# Patient Record
Sex: MALE | Race: WHITE | NOT HISPANIC OR LATINO | URBAN - METROPOLITAN AREA
[De-identification: names, ages, dates, MRNs, and addresses within clinical notes are randomized per-mention and may not be internally consistent; named-entity substitution may affect disease eponyms.]

---

## 2018-05-16 ENCOUNTER — HISTORICAL (OUTPATIENT)
Dept: ADMINISTRATIVE | Facility: HOSPITAL | Age: 58
End: 2018-05-16

## 2018-05-16 LAB
ABS NEUT (OLG): 2.9
ALBUMIN SERPL-MCNC: 4.7 GM/DL (ref 3.4–5)
ALBUMIN/GLOB SERPL: 2.04 {RATIO} (ref 1.5–2.5)
ALP SERPL-CCNC: 51 UNIT/L (ref 38–126)
ALT SERPL-CCNC: 39 UNIT/L (ref 7–52)
APPEARANCE, UA: CLEAR
AST SERPL-CCNC: 26 UNIT/L (ref 15–37)
BACTERIA #/AREA URNS AUTO: ABNORMAL /HPF
BILIRUB SERPL-MCNC: 0.7 MG/DL (ref 0.2–1)
BILIRUB UR QL STRIP: NEGATIVE MG/DL
BILIRUBIN DIRECT+TOT PNL SERPL-MCNC: 0.1 MG/DL (ref 0–0.5)
BILIRUBIN DIRECT+TOT PNL SERPL-MCNC: 0.6 MG/DL
BUN SERPL-MCNC: 21 MG/DL (ref 7–18)
CALCIUM SERPL-MCNC: 9.3 MG/DL (ref 8.5–10)
CHLORIDE SERPL-SCNC: 104 MMOL/L (ref 98–107)
CHOLEST SERPL-MCNC: 177 MG/DL (ref 0–200)
CHOLEST/HDLC SERPL: 5.7 {RATIO}
CO2 SERPL-SCNC: 29 MMOL/L (ref 21–32)
COLOR UR: YELLOW
CREAT SERPL-MCNC: 0.9 MG/DL (ref 0.6–1.3)
ERYTHROCYTE [DISTWIDTH] IN BLOOD BY AUTOMATED COUNT: 13.3 % (ref 11.5–17)
GLOBULIN SER-MCNC: 2.3 GM/DL (ref 1.2–3)
GLUCOSE (UA): NEGATIVE MG/DL
GLUCOSE SERPL-MCNC: 111 MG/DL (ref 74–106)
HCT VFR BLD AUTO: 51.2 % (ref 42–52)
HDLC SERPL-MCNC: 31 MG/DL (ref 35–60)
HGB BLD-MCNC: 16.8 GM/DL (ref 14–18)
HGB UR QL STRIP: ABNORMAL UNIT/L
KETONES UR QL STRIP: NEGATIVE MG/DL
LDLC SERPL CALC-MCNC: 117 MG/DL (ref 0–129)
LEUKOCYTE ESTERASE UR QL STRIP: NEGATIVE UNIT/L
LYMPHOCYTES # BLD AUTO: 2.4 X10(3)/MCL (ref 0.6–3.4)
LYMPHOCYTES NFR BLD AUTO: 39.8 % (ref 13–40)
MCH RBC QN AUTO: 31.3 PG (ref 27–31.2)
MCHC RBC AUTO-ENTMCNC: 33 GM/DL (ref 32–36)
MCV RBC AUTO: 95 FL (ref 80–94)
MONOCYTES # BLD AUTO: 0.8 X10(3)/MCL (ref 0–1.8)
MONOCYTES NFR BLD AUTO: 12.7 % (ref 0.1–24)
NEUTROPHILS NFR BLD AUTO: 47.5 % (ref 47–80)
NITRITE UR QL STRIP.AUTO: NEGATIVE
PH UR STRIP: 6 [PH]
PLATELET # BLD AUTO: 251 X10(3)/MCL (ref 130–400)
PMV BLD AUTO: 9.9 FL
POTASSIUM SERPL-SCNC: 4.6 MMOL/L (ref 3.5–5.1)
PROT SERPL-MCNC: 7 GM/DL (ref 6.4–8.2)
PROT UR QL STRIP: NEGATIVE MG/DL
PSA SERPL-MCNC: 1 NG/ML (ref 0–3.5)
RBC # BLD AUTO: 5.37 X10(6)/MCL (ref 4.7–6.1)
RBC #/AREA URNS HPF: ABNORMAL /HPF
SODIUM SERPL-SCNC: 141 MMOL/L (ref 136–145)
SP GR UR STRIP: 1.02
SQUAMOUS EPITHELIAL, UA: ABNORMAL /LPF
TRIGL SERPL-MCNC: 126 MG/DL (ref 30–150)
UROBILINOGEN UR STRIP-ACNC: 0.2 MG/DL
VLDLC SERPL CALC-MCNC: 25.2 MG/DL
WBC # SPEC AUTO: 6.1 X10(3)/MCL (ref 4.5–11.5)
WBC #/AREA URNS AUTO: ABNORMAL /[HPF]

## 2018-08-24 ENCOUNTER — HISTORICAL (OUTPATIENT)
Dept: ADMINISTRATIVE | Facility: HOSPITAL | Age: 58
End: 2018-08-24

## 2018-08-24 LAB — TESTOST SERPL-MCNC: 417 NG/DL (ref 300–1060)

## 2019-02-15 ENCOUNTER — HISTORICAL (OUTPATIENT)
Dept: ADMINISTRATIVE | Facility: HOSPITAL | Age: 59
End: 2019-02-15

## 2019-02-15 LAB
APPEARANCE, UA: CLEAR
BACTERIA #/AREA URNS AUTO: NORMAL /HPF
BILIRUB UR QL STRIP: NEGATIVE MG/DL
COLOR UR: YELLOW
GLUCOSE (UA): NEGATIVE MG/DL
HGB UR QL STRIP: NEGATIVE UNIT/L
KETONES UR QL STRIP: NEGATIVE MG/DL
LEUKOCYTE ESTERASE UR QL STRIP: NEGATIVE UNIT/L
NITRITE UR QL STRIP.AUTO: NEGATIVE
PH UR STRIP: 7 [PH]
PROT UR QL STRIP: NEGATIVE MG/DL
RBC #/AREA URNS HPF: NORMAL /HPF
SP GR UR STRIP: 1.02
SQUAMOUS EPITHELIAL, UA: NORMAL /LPF
UROBILINOGEN UR STRIP-ACNC: 0.2 MG/DL
WBC #/AREA URNS AUTO: NORMAL /[HPF]

## 2019-06-14 ENCOUNTER — HISTORICAL (OUTPATIENT)
Dept: ADMINISTRATIVE | Facility: HOSPITAL | Age: 59
End: 2019-06-14

## 2019-06-14 LAB
ABS NEUT (OLG): 3 X10(3)/MCL (ref 2.1–9.2)
ALBUMIN SERPL-MCNC: 4.8 GM/DL (ref 3.4–5)
ALBUMIN/GLOB SERPL: 1.78 {RATIO} (ref 1.5–2.5)
ALP SERPL-CCNC: 47 UNIT/L (ref 38–126)
ALT SERPL-CCNC: 38 UNIT/L (ref 7–52)
APPEARANCE, UA: CLEAR
AST SERPL-CCNC: 32 UNIT/L (ref 15–37)
BACTERIA #/AREA URNS AUTO: NORMAL /HPF
BILIRUB SERPL-MCNC: 0.6 MG/DL (ref 0.2–1)
BILIRUB UR QL STRIP: NEGATIVE MG/DL
BILIRUBIN DIRECT+TOT PNL SERPL-MCNC: 0.1 MG/DL (ref 0–0.5)
BILIRUBIN DIRECT+TOT PNL SERPL-MCNC: 0.5 MG/DL
BUN SERPL-MCNC: 17 MG/DL (ref 7–18)
CALCIUM SERPL-MCNC: 9.9 MG/DL (ref 8.5–10)
CHLORIDE SERPL-SCNC: 102 MMOL/L (ref 98–107)
CHOLEST SERPL-MCNC: 182 MG/DL (ref 0–200)
CHOLEST/HDLC SERPL: 4.6 {RATIO}
CO2 SERPL-SCNC: 26 MMOL/L (ref 21–32)
COLOR UR: NORMAL
CREAT SERPL-MCNC: 0.93 MG/DL (ref 0.6–1.3)
ERYTHROCYTE [DISTWIDTH] IN BLOOD BY AUTOMATED COUNT: 13.1 % (ref 11.5–17)
EST. AVERAGE GLUCOSE BLD GHB EST-MCNC: 108 MG/DL
GLOBULIN SER-MCNC: 2.7 GM/DL (ref 1.2–3)
GLUCOSE (UA): NEGATIVE MG/DL
GLUCOSE SERPL-MCNC: 95 MG/DL (ref 74–106)
HBA1C MFR BLD: 5.4 % (ref 4.4–6.4)
HCT VFR BLD AUTO: 50.2 % (ref 42–52)
HDLC SERPL-MCNC: 40 MG/DL (ref 35–60)
HGB BLD-MCNC: 16.5 GM/DL (ref 14–18)
HGB UR QL STRIP: NEGATIVE UNIT/L
KETONES UR QL STRIP: NEGATIVE MG/DL
LDLC SERPL CALC-MCNC: 113 MG/DL (ref 0–129)
LEUKOCYTE ESTERASE UR QL STRIP: NEGATIVE UNIT/L
LYMPHOCYTES # BLD AUTO: 2.4 X10(3)/MCL (ref 0.6–3.4)
LYMPHOCYTES NFR BLD AUTO: 40.1 % (ref 13–40)
MCH RBC QN AUTO: 30.6 PG (ref 27–31.2)
MCHC RBC AUTO-ENTMCNC: 33 GM/DL (ref 32–36)
MCV RBC AUTO: 93 FL (ref 80–94)
MONOCYTES # BLD AUTO: 0.7 X10(3)/MCL (ref 0.1–1.3)
MONOCYTES NFR BLD AUTO: 11.9 % (ref 0.1–24)
NEUTROPHILS NFR BLD AUTO: 48 % (ref 47–80)
NITRITE UR QL STRIP.AUTO: NEGATIVE
PH UR STRIP: 6.5 [PH]
PLATELET # BLD AUTO: 256 X10(3)/MCL (ref 130–400)
PMV BLD AUTO: 9.7 FL (ref 9.4–12.4)
POTASSIUM SERPL-SCNC: 4.1 MMOL/L (ref 3.5–5.1)
PROT SERPL-MCNC: 7.5 GM/DL (ref 6.4–8.2)
PROT UR QL STRIP: NEGATIVE MG/DL
PSA SERPL-MCNC: 1.03 NG/ML (ref 0–3.5)
RBC # BLD AUTO: 5.4 X10(6)/MCL (ref 4.7–6.1)
RBC #/AREA URNS HPF: NORMAL /HPF
SODIUM SERPL-SCNC: 138 MMOL/L (ref 136–145)
SP GR UR STRIP: 1.02
SQUAMOUS EPITHELIAL, UA: NORMAL /LPF
TRIGL SERPL-MCNC: 232 MG/DL (ref 30–150)
UROBILINOGEN UR STRIP-ACNC: 1 MG/DL
VLDLC SERPL CALC-MCNC: 46.4 MG/DL
WBC # SPEC AUTO: 6.1 X10(3)/MCL (ref 4.5–11.5)
WBC #/AREA URNS AUTO: NORMAL /[HPF]

## 2020-03-03 ENCOUNTER — HISTORICAL (OUTPATIENT)
Dept: LAB | Facility: HOSPITAL | Age: 60
End: 2020-03-03

## 2020-03-03 ENCOUNTER — HISTORICAL (OUTPATIENT)
Dept: ADMINISTRATIVE | Facility: HOSPITAL | Age: 60
End: 2020-03-03

## 2020-03-03 LAB
ABS NEUT (OLG): 3.9 X10(3)/MCL (ref 2.1–9.2)
ALBUMIN SERPL-MCNC: 4.6 GM/DL (ref 3.4–5)
ALBUMIN/GLOB SERPL: 1.84 {RATIO} (ref 1.5–2.5)
ALP SERPL-CCNC: 42 UNIT/L (ref 38–126)
ALT SERPL-CCNC: 47 UNIT/L (ref 7–52)
AMYLASE SERPL-CCNC: 70 UNIT/L (ref 25–115)
APPEARANCE, UA: CLEAR
AST SERPL-CCNC: 37 UNIT/L (ref 15–37)
BACTERIA #/AREA URNS AUTO: NORMAL /HPF
BILIRUB SERPL-MCNC: 1 MG/DL (ref 0.2–1)
BILIRUB UR QL STRIP: NEGATIVE MG/DL
BILIRUBIN DIRECT+TOT PNL SERPL-MCNC: 0.2 MG/DL (ref 0–0.5)
BILIRUBIN DIRECT+TOT PNL SERPL-MCNC: 0.8 MG/DL
BUN SERPL-MCNC: 15 MG/DL (ref 7–18)
CALCIUM SERPL-MCNC: 9.9 MG/DL (ref 8.5–10)
CHLORIDE SERPL-SCNC: 98 MMOL/L (ref 98–107)
CO2 SERPL-SCNC: 34 MMOL/L (ref 21–32)
COLOR UR: NORMAL
CREAT SERPL-MCNC: 0.85 MG/DL (ref 0.6–1.3)
ERYTHROCYTE [DISTWIDTH] IN BLOOD BY AUTOMATED COUNT: 13.1 % (ref 11.5–17)
GLOBULIN SER-MCNC: 2.5 GM/DL (ref 1.2–3)
GLUCOSE (UA): NEGATIVE MG/DL
GLUCOSE SERPL-MCNC: 106 MG/DL (ref 74–106)
HCT VFR BLD AUTO: 52.5 % (ref 42–52)
HGB BLD-MCNC: 17 GM/DL (ref 14–18)
HGB UR QL STRIP: NEGATIVE UNIT/L
KETONES UR QL STRIP: NEGATIVE MG/DL
LEUKOCYTE ESTERASE UR QL STRIP: NEGATIVE UNIT/L
LIPASE SERPL-CCNC: 127 UNIT/L (ref 73–393)
LYMPHOCYTES # BLD AUTO: 2 X10(3)/MCL (ref 0.6–3.4)
LYMPHOCYTES NFR BLD AUTO: 30.1 % (ref 13–40)
MCH RBC QN AUTO: 30.3 PG (ref 27–31.2)
MCHC RBC AUTO-ENTMCNC: 32 GM/DL (ref 32–36)
MCV RBC AUTO: 94 FL (ref 80–94)
MONOCYTES # BLD AUTO: 0.6 X10(3)/MCL (ref 0.1–1.3)
MONOCYTES NFR BLD AUTO: 9.4 % (ref 0.1–24)
NEUTROPHILS NFR BLD AUTO: 60.5 % (ref 47–80)
NITRITE UR QL STRIP.AUTO: NEGATIVE
PH UR STRIP: 6.5 [PH]
PLATELET # BLD AUTO: 232 X10(3)/MCL (ref 130–400)
PMV BLD AUTO: 10 FL (ref 9.4–12.4)
POTASSIUM SERPL-SCNC: 4.9 MMOL/L (ref 3.5–5.1)
PROT SERPL-MCNC: 7.1 GM/DL (ref 6.4–8.2)
PROT UR QL STRIP: NEGATIVE MG/DL
RBC # BLD AUTO: 5.61 X10(6)/MCL (ref 4.7–6.1)
RBC #/AREA URNS HPF: NORMAL /HPF
SODIUM SERPL-SCNC: 139 MMOL/L (ref 136–145)
SP GR UR STRIP: 1.01
SQUAMOUS EPITHELIAL, UA: NORMAL /LPF
UROBILINOGEN UR STRIP-ACNC: 0.2 MG/DL
WBC # SPEC AUTO: 6.5 X10(3)/MCL (ref 4.5–11.5)
WBC #/AREA URNS AUTO: NORMAL /[HPF]

## 2020-06-17 ENCOUNTER — HISTORICAL (OUTPATIENT)
Dept: ADMINISTRATIVE | Facility: HOSPITAL | Age: 60
End: 2020-06-17

## 2020-06-17 LAB
ABS NEUT (OLG): 2.7 X10(3)/MCL (ref 2.1–9.2)
ALBUMIN SERPL-MCNC: 4.6 GM/DL (ref 3.4–5)
ALBUMIN/GLOB SERPL: 1.84 {RATIO} (ref 1.5–2.5)
ALP SERPL-CCNC: 45 UNIT/L (ref 38–126)
ALT SERPL-CCNC: 39 UNIT/L (ref 7–52)
APPEARANCE, UA: CLEAR
AST SERPL-CCNC: 28 UNIT/L (ref 15–37)
BACTERIA #/AREA URNS AUTO: NORMAL /HPF
BILIRUB SERPL-MCNC: 1 MG/DL (ref 0.2–1)
BILIRUB UR QL STRIP: NEGATIVE MG/DL
BILIRUBIN DIRECT+TOT PNL SERPL-MCNC: 0.2 MG/DL (ref 0–0.5)
BILIRUBIN DIRECT+TOT PNL SERPL-MCNC: 0.8 MG/DL
BUN SERPL-MCNC: 16 MG/DL (ref 7–18)
CALCIUM SERPL-MCNC: 9.7 MG/DL (ref 8.5–10)
CHLORIDE SERPL-SCNC: 101 MMOL/L (ref 98–107)
CHOLEST SERPL-MCNC: 163 MG/DL (ref 0–200)
CHOLEST/HDLC SERPL: 5.1 {RATIO}
CO2 SERPL-SCNC: 31 MMOL/L (ref 21–32)
COLOR UR: YELLOW
CREAT SERPL-MCNC: 0.77 MG/DL (ref 0.6–1.3)
DEPRECATED CALCIDIOL+CALCIFEROL SERPL-MC: 23.7 NG/ML (ref 30–80)
ERYTHROCYTE [DISTWIDTH] IN BLOOD BY AUTOMATED COUNT: 13 % (ref 11.5–17)
EST. AVERAGE GLUCOSE BLD GHB EST-MCNC: 111 MG/DL
GLOBULIN SER-MCNC: 2.5 GM/DL (ref 1.2–3)
GLUCOSE (UA): NEGATIVE MG/DL
GLUCOSE SERPL-MCNC: 101 MG/DL (ref 74–106)
HBA1C MFR BLD: 5.5 % (ref 4.4–6.4)
HCT VFR BLD AUTO: 52 % (ref 42–52)
HDLC SERPL-MCNC: 32 MG/DL (ref 35–60)
HGB BLD-MCNC: 17.2 GM/DL (ref 14–18)
HGB UR QL STRIP: NEGATIVE UNIT/L
KETONES UR QL STRIP: NEGATIVE MG/DL
LDLC SERPL CALC-MCNC: 77 MG/DL (ref 0–129)
LEUKOCYTE ESTERASE UR QL STRIP: NEGATIVE UNIT/L
LYMPHOCYTES # BLD AUTO: 2.6 X10(3)/MCL (ref 0.6–3.4)
LYMPHOCYTES NFR BLD AUTO: 43.8 % (ref 13–40)
MCH RBC QN AUTO: 31.2 PG (ref 27–31.2)
MCHC RBC AUTO-ENTMCNC: 33 GM/DL (ref 32–36)
MCV RBC AUTO: 94 FL (ref 80–94)
MONOCYTES # BLD AUTO: 0.6 X10(3)/MCL (ref 0.1–1.3)
MONOCYTES NFR BLD AUTO: 9.8 % (ref 0.1–24)
NEUTROPHILS NFR BLD AUTO: 46.4 % (ref 47–80)
NITRITE UR QL STRIP.AUTO: NEGATIVE
PH UR STRIP: 6 [PH]
PLATELET # BLD AUTO: 215 X10(3)/MCL (ref 130–400)
PMV BLD AUTO: 10.6 FL (ref 9.4–12.4)
POTASSIUM SERPL-SCNC: 4.3 MMOL/L (ref 3.5–5.1)
PROT SERPL-MCNC: 7.1 GM/DL (ref 6.4–8.2)
PROT UR QL STRIP: NEGATIVE MG/DL
PSA SERPL-MCNC: 1.32 NG/ML (ref 0–4.5)
RBC # BLD AUTO: 5.52 X10(6)/MCL (ref 4.7–6.1)
RBC #/AREA URNS HPF: NORMAL /HPF
SODIUM SERPL-SCNC: 141 MMOL/L (ref 136–145)
SP GR UR STRIP: 1.02
SQUAMOUS EPITHELIAL, UA: NORMAL /LPF
TRIGL SERPL-MCNC: 270 MG/DL (ref 30–150)
TSH SERPL-ACNC: 1.63 MIU/ML (ref 0.35–4.94)
UROBILINOGEN UR STRIP-ACNC: 0.2 MG/DL
VLDLC SERPL CALC-MCNC: 54 MG/DL
WBC # SPEC AUTO: 5.9 X10(3)/MCL (ref 4.5–11.5)
WBC #/AREA URNS AUTO: NORMAL /[HPF]

## 2021-01-08 ENCOUNTER — HISTORICAL (OUTPATIENT)
Dept: URGENT CARE | Facility: CLINIC | Age: 61
End: 2021-01-08

## 2021-05-16 LAB
RAPID GROUP A STREP (OHS): NEGATIVE
SARS-COV-2 RNA RESP QL NAA+PROBE: NEGATIVE

## 2021-07-01 ENCOUNTER — HISTORICAL (OUTPATIENT)
Dept: ADMINISTRATIVE | Facility: HOSPITAL | Age: 61
End: 2021-07-01

## 2021-07-01 LAB
ABS NEUT (OLG): 3.5 X10(3)/MCL (ref 2.1–9.2)
ALBUMIN SERPL-MCNC: 4.9 GM/DL (ref 3.4–5)
ALBUMIN/GLOB SERPL: 1.96 {RATIO} (ref 1.5–2.5)
ALP SERPL-CCNC: 53 UNIT/L (ref 38–126)
ALT SERPL-CCNC: 40 UNIT/L (ref 7–52)
APPEARANCE, UA: CLEAR
AST SERPL-CCNC: 28 UNIT/L (ref 15–37)
BACTERIA #/AREA URNS AUTO: NORMAL /HPF
BILIRUB SERPL-MCNC: 1.1 MG/DL (ref 0.2–1)
BILIRUB UR QL STRIP: NEGATIVE MG/DL
BILIRUBIN DIRECT+TOT PNL SERPL-MCNC: 0.2 MG/DL (ref 0–0.5)
BILIRUBIN DIRECT+TOT PNL SERPL-MCNC: 0.9 MG/DL
BUN SERPL-MCNC: 16 MG/DL (ref 7–18)
CALCIUM SERPL-MCNC: 9.8 MG/DL (ref 8.5–10.1)
CHLORIDE SERPL-SCNC: 101 MMOL/L (ref 98–107)
CHOLEST SERPL-MCNC: 148 MG/DL (ref 0–200)
CHOLEST/HDLC SERPL: 4.6 {RATIO}
CO2 SERPL-SCNC: 30 MMOL/L (ref 21–32)
COLOR UR: YELLOW
CREAT SERPL-MCNC: 0.82 MG/DL (ref 0.6–1.3)
DEPRECATED CALCIDIOL+CALCIFEROL SERPL-MC: 44.5 NG/ML (ref 30–80)
ERYTHROCYTE [DISTWIDTH] IN BLOOD BY AUTOMATED COUNT: 13.3 % (ref 11.5–17)
EST CREAT CLEARANCE SER (OHS): 137.02 ML/MIN
GLOBULIN SER-MCNC: 2.5 GM/DL (ref 1.2–3)
GLUCOSE (UA): NEGATIVE MG/DL
GLUCOSE SERPL-MCNC: 104 MG/DL (ref 74–106)
HCT VFR BLD AUTO: 52.6 % (ref 42–52)
HDLC SERPL-MCNC: 32 MG/DL (ref 35–60)
HGB BLD-MCNC: 17.3 GM/DL (ref 14–18)
HGB UR QL STRIP: NEGATIVE UNIT/L
KETONES UR QL STRIP: NEGATIVE MG/DL
LDLC SERPL CALC-MCNC: 91 MG/DL (ref 0–129)
LEUKOCYTE ESTERASE UR QL STRIP: NEGATIVE UNIT/L
LYMPHOCYTES # BLD AUTO: 2.3 X10(3)/MCL (ref 0.6–3.4)
LYMPHOCYTES NFR BLD AUTO: 34.7 % (ref 13–40)
MCH RBC QN AUTO: 31 PG (ref 27–31.2)
MCHC RBC AUTO-ENTMCNC: 33 GM/DL (ref 32–36)
MCV RBC AUTO: 94 FL (ref 80–94)
MONOCYTES # BLD AUTO: 0.7 X10(3)/MCL (ref 0.1–1.3)
MONOCYTES NFR BLD AUTO: 10.2 % (ref 0.1–24)
NEUTROPHILS NFR BLD AUTO: 55.1 % (ref 47–80)
NITRITE UR QL STRIP.AUTO: NEGATIVE
PH UR STRIP: 6 [PH]
PLATELET # BLD AUTO: 207 X10(3)/MCL (ref 130–400)
PMV BLD AUTO: 9.8 FL (ref 9.4–12.4)
POTASSIUM SERPL-SCNC: 4.6 MMOL/L (ref 3.5–5.1)
PROT SERPL-MCNC: 7.4 GM/DL (ref 6.4–8.2)
PROT UR QL STRIP: NEGATIVE MG/DL
PSA SERPL-MCNC: 1.23 NG/ML (ref 0–4.5)
RBC # BLD AUTO: 5.58 X10(6)/MCL (ref 4.7–6.1)
RBC #/AREA URNS HPF: NORMAL /HPF
SODIUM SERPL-SCNC: 140 MMOL/L (ref 136–145)
SP GR UR STRIP: 1.02
SQUAMOUS EPITHELIAL, UA: NORMAL /LPF
TRIGL SERPL-MCNC: 171 MG/DL (ref 30–150)
TSH SERPL-ACNC: 1.27 MIU/ML (ref 0.35–4.94)
UROBILINOGEN UR STRIP-ACNC: 1 MG/DL
VLDLC SERPL CALC-MCNC: 34.2 MG/DL
WBC # SPEC AUTO: 6.5 X10(3)/MCL (ref 4.5–11.5)
WBC #/AREA URNS AUTO: NORMAL /[HPF]

## 2021-08-03 LAB
RAPID GROUP A STREP (OHS): NEGATIVE
SARS-COV-2 RNA RESP QL NAA+PROBE: POSITIVE

## 2021-08-10 ENCOUNTER — HISTORICAL (OUTPATIENT)
Dept: INFECTIOUS DISEASES | Facility: HOSPITAL | Age: 61
End: 2021-08-10

## 2021-10-26 LAB
RAPID GROUP A STREP (OHS): NEGATIVE
SARS-COV-2 RNA RESP QL NAA+PROBE: NEGATIVE

## 2022-04-10 ENCOUNTER — HISTORICAL (OUTPATIENT)
Dept: ADMINISTRATIVE | Facility: HOSPITAL | Age: 62
End: 2022-04-10
Payer: COMMERCIAL

## 2022-04-26 VITALS
DIASTOLIC BLOOD PRESSURE: 86 MMHG | BODY MASS INDEX: 32.19 KG/M2 | HEIGHT: 70 IN | SYSTOLIC BLOOD PRESSURE: 131 MMHG | WEIGHT: 224.88 LBS | OXYGEN SATURATION: 96 %

## 2022-05-02 NOTE — HISTORICAL [facility] CERNER
This is a historical note converted from Rupa. Formatting and pictures may have been removed.  Please reference Rupa for original formatting and attached multimedia. Chief Complaint  C/O LT FLANK PAIN, MILD DISCOMFORT X 1 DAY  History of Present Illness  58 y/o male here for left flank pain for 24 ?hours.  says he googled and thought it could be a kidney stone although he says he has never had a kidney stone.  he does report h/o diverticular disease.  says?pain started yesterday morning. took advil and started drinking water.  denies urine symptoms.  no dysuria, no blood  pain is?left sided, flank and left abdomen.  scale is 4/10 after?taking advil  pain is constant left sided.  last BM last night.  no blood, normal.  last meal was this morning, denies nausea or vomiting.  denies fever  ?  Dr. Carl hx;  HEENT -?? eye exam-needs exam and will do this year or early next year.  ??? Diplopia - right superior oblique palsy -resloved  CHEST? - no SOB  C/V - Dr Stephens - yrly  GI? - Dr. Watt colonoscope 2011 - repeat 10 yrs.????? rectal?stricture  ?? Dr. Nicole - chronic GERD/stricture  ???Diverticulitis - 2015 & 2016 & 2019?- Dr. Dexter CORNEJO - nocturia x 2?????? ED -RX - Sildenafil 20 mg  M/S -? Right hand laceration/fracture - Dr. Sykes/Solomon Lainez  ??? Dr Lopez - L5 spondylolisthesis?-doing well.  ??? MRI - 10/6/16 - Foraminal stenosis, Left sciatica  ??? Dr Solomon Lainez - CTS, s/p ganglion  ??? Dr. Nelson - plantar fasciiitis?  LAST CPX 5/16/18  Review of Systems  Constitutional: No fever, No chills, No sweats, No weakness, No fatigue  ?????Eye: No blurring, No double vision.  ?????Ear/Nose/Mouth/Throat: No nasal congestion, No sore throat.  ?????Respiratory: No shortness of breath, No cough, No wheezing, No cyanosis.  ?????Cardiovascular: No chest pain, No palpitations, No bradycardia, No tachycardia, No peripheral edema.  ?????Gastrointestinal: left flank and left quadrant abdominal pain,  No nausea, No vomiting, No diarrhea, No constipation,?  ?????Genitourinary: No dysuria, No hematuria.  ?????Musculoskeletal: No back pain, No neck pain, No joint pain, No muscle pain, No claudication.  ?????Integumentary: No rash.  ?????Neurologic: No abnormal balance, No confusion, No numbness, No tingling, No headache.  ??  Physical Exam  Vitals & Measurements  T:?36.8? ?C (Oral)? HR:?75(Peripheral)? BP:?140/92?  HT:?177.5?cm? WT:?107?kg? BMI:?33.96?  ????General: Alert and oriented, No acute distress.  ?????Eye: Extraocular movements are intact, Normal conjunctiva.  ?????HENT: Normocephalic, Normal hearing, Oral mucosa is moist.  ?????Neck: Supple, Non-tender,? No jugular venous distention, No lymphadenopathy, No thyromegaly.  ?????Respiratory: Lungs are clear to auscultation, Respirations are non-labored, Breath sounds are equal, Symmetrical chest wall expansion, No chest wall tenderness.  ?????Cardiovascular: Normal rate, Regular rhythm,? No gallop, Good pulses equal in all extremities, Normal peripheral perfusion, No edema.  ?????Gastrointestinal: Soft,?positive bowel sounds, mild distention, no guarding, no rebound, mild tenderness on deep palpation left abdomen  : NO CVA tenderness  ?????Musculoskeletal: Normal range of motion, Normal gait.  ?????Integumentary: Warm, Dry, Intact.  ?????Neurologic: Alert, Oriented, No focal deficits.  ?????Psychiatric: Cooperative, Appropriate mood & affect,  Assessment/Plan  1.?Lt flank pain?R10.9  Ordered:  Schedule Diagnostics Study, CT abdomen pelvis w and w/o, within 2 days, 03/03/20 11:23:00 CST, Abdominal pain, left lower quadrant  Lt flank pain  Diverticulitis  ?  2.?Abdominal pain, left lower quadrant?R10.32  Ordered:  Amylase Level, Routine collect, 03/03/20 11:20:00 CST, Blood, Order for future visit, Stop date 03/03/20 11:20:00 CST, Lab Collect, Abdominal pain, left lower quadrant, 03/03/20 11:20:00 CST  Automated Diff, Routine collect, 03/03/20 11:30:00 CST,  Blood, Collected, Stop date 03/03/20 11:30:00 CST, Lab Collect, Abdominal pain, left lower quadrant, 03/03/20 11:30:00 CST  CBC w/ Auto Diff, Routine collect, 03/03/20 11:30:00 CST, Blood, Stop date 03/03/20 11:30:00 CST, Lab Collect, Abdominal pain, left lower quadrant, 03/03/20 11:30:00 CST  Comprehensive Metabolic Panel, Routine collect, 03/03/20 11:30:00 CST, Blood, Stop date 03/03/20 11:30:00 CST, Lab Collect, Abdominal pain, left lower quadrant, 03/03/20 11:30:00 CST  Lipase Level, Routine collect, 03/03/20 11:20:00 CST, Blood, Order for future visit, Stop date 03/03/20 11:20:00 CST, Lab Collect, Abdominal pain, left lower quadrant, 03/03/20 11:20:00 CST  Schedule Diagnostics Study, CT abdomen pelvis w and w/o, within 2 days, 03/03/20 11:23:00 CST, Abdominal pain, left lower quadrant  Lt flank pain  Diverticulitis  ?  3.?Diverticular disease?K57.90  ?  lab collection, CT abdomen.  hydration, low residue nutritional intake  tylenol prn for pain  discussed ER precautions if pain worsneing, development of fever or unable to intake oral liquids or development of vomiting or not having bowel movements or blood in stool.   Problem List/Past Medical History  Ongoing  Abnormal digital rectal exam  Degenerative disc disease  Diplopia  Diverticulitis  Duodenitis  Esophageal stricture  Fatty liver  GERD - Gastro-esophageal reflux disease  H/O hyperglycemia  Hypercholesterolemia  Hypertriglyceridemia  Immunization due  Lumbar spondylolisthesis  Lumbar spondylosis  Male erectile disorder  Mitral regurgitation  Nocturia  Obesity  Rectal stricture  Superior oblique palsy  Wellness examination  Historical  Allergic rhinitis  Dislocation of interphalangeal joint of right thumb  Erectile dysfunction  Extensor tendon laceration of right hand with open wound  GERD - Gastro-esophageal reflux disease  Hypercholesterolemia  Hypertriglyceridemia  Open displaced fracture of first metacarpal bone of right hand  Open displaced  fracture of proximal phalanx of right thumb  Open displaced fracture of shaft of second metacarpal bone of right hand  Procedure/Surgical History  Incision & Drainage Upper Extremity (Right, Hand) (11/11/2016)  Insertion of Internal Fixation Device into Right Thumb Phalanx, Percutaneous Approach (11/11/2016)  ORIF Hand (Right) (11/11/2016)  Repair Right Hand Tendon, Open Approach (11/11/2016)  Reposition Right Metacarpal with Internal Fixation Device, Open Approach (11/11/2016)  Colonoscopy (2011)  Ganglion cyst (2005)  sinus sx (2001)  Tonsillectomy and adenoidectomy (1968)  carpel tunnel release  hemmrrrhoid removal   Medications  Advil 200 mg oral tablet, 400 mg= 2 tab(s), Oral, q4hr, PRN  atorvastatin 20 mg oral tablet, See Instructions, 3 refills  Claritin 24 Hour Allergy, Daily  fenofibrate 160 mg oral tablet, 160 mg= 1 tab(s), Oral, Daily, 2 refills,? ?Not Taking, Completed Rx  hydrochlorothiazide 12.5 mg oral tablet, 12.5 mg= 1 tab(s), Oral, Daily  lansoprazole 30 mg oral DR capsule, See Instructions, 3 refills  sildenafil 20 mg oral tablet, 20 mg, Oral, Daily  Allergies  NexIUM?(Muscle spasm - tone)  Social History  Abuse/Neglect  No, 03/03/2020  No, 02/05/2020  Alcohol  Current, 1-2 times per month, 05/05/2018  Employment/School  Employed, Work/School description: ., 05/05/2018  Exercise  Home/Environment  Lives with Spouse., 05/05/2018  Substance Use  Never, 05/16/2018  Tobacco  Never (less than 100 in lifetime), No, 03/03/2020  Never (less than 100 in lifetime), No, 02/05/2020  Family History  Alzheimers disease: Father.  Asthma.: Mother.  Dementia: Mother.  Hypercholesterolaemia: Brother.  Osteoporosis: Sister.  Stroke: Mother and Father.  Immunizations  Vaccine Date Status   influenza virus vaccine, inactivated 11/21/2019 Recorded   influenza virus vaccine, inactivated 11/28/2016 Recorded   tetanus/diphtheria/pertussis, acel(Tdap) 11/10/2016 Given   Health Maintenance  Health  Maintenance  ???Pending?(in the next year)  ??? ??OverDue  ??? ? ? ?Diabetes Screening due??and every?  ??? ? ? ?Depression Screening due??11/21/17??and every 1??year(s)  ??? ? ? ?Alcohol Misuse Screening due??01/01/20??and every 1??year(s)  ??? ??Due?  ??? ? ? ?ADL Screening due??03/03/20??and every 1??year(s)  ??? ? ? ?Aspirin Therapy for CVD Prevention due??03/03/20??and every 1??year(s)  ??? ? ? ?Zoster Vaccine due??03/03/20??and every 100??year(s)  ??? ??Due In Future?  ??? ? ? ?Hypertension Management-BMP not due until??06/13/20??and every 1??year(s)  ??? ? ? ?Obesity Screening not due until??01/01/21??and every 1??year(s)  ???Satisfied?(in the past 1 year)  ??? ??Satisfied?  ??? ? ? ?Blood Pressure Screening on??03/03/20.??Satisfied by Juliette Ovalle LPN  ??? ? ? ?Body Mass Index Check on??03/03/20.??Satisfied by Juliette Ovalle LPN  ??? ? ? ?Diabetes Screening on??06/14/19.??Satisfied by Jhoana Benson  ??? ? ? ?Hypertension Management-Blood Pressure on??03/03/20.??Satisfied by Juliette Ovalle LPN  ??? ? ? ?Influenza Vaccine on??11/21/19.??Satisfied by Juliette Ovalle LPN  ??? ? ? ?Lipid Screening on??06/14/19.??Satisfied by Jhoana Benson  ??? ? ? ?Obesity Screening on??03/03/20.??Satisfied by Juliette Ovalle LPN  ?

## 2022-05-02 NOTE — HISTORICAL [facility]ERN[facility]
This is a historical note converted from Rupa. Formatting and pictures may have been removed.  Please reference Rupa for original formatting and attached multimedia. Chief Complaint  CPX/ NOT FASTING. LAST ATE AT 8:30 THIS MORNING  History of Present Illness  Patient is here today for wellness CPX. ?He is tolerating all medications without side effects.? He did have extensive?right hand surgery as result of an accident using a?skill saw.? He is healed from this well.? We discussed the new recommendations with?regards to treatment of?hypertriglyceridemia and as result?I informed him that he can discontinue fenofibrate.  Review of Systems  GENERAL:?no? unexplained wt ?loss or gain, no fever, fatigue, chills, night sweats or ?weakness  HEENT: no?? sore throat, ?ear pain, ?sinus pressure, ?nasal congestion, or ?rhinorrhea, +AR  VISION:?no ?vision changes, ?glaucoma, cataracts, +glasses  CARDIAC: no? chest pain,??palpitations,?Dyspnea on exertion, ?orthopnea--sees Dr Stephens--mitral valve  RESPIRATORY:?+ x couple weeks??cough,?no wheezing, sputum production or?SOB  GI: no???abdominal pain, n&v,?constipation, diarrhea,??blood in stool or_?no family history of colon cancer?_  :?no? dysuria, ?hematuria, ?frequency, urgency, ?incontinence,? testicular pain/swelling,?_?no? family history of prostate cancer_  [facility]:? no? myalgia, ?weakness, edema,?? + left thumb from surgery? arthralgia, or ?no joint effusion  SKIN:? No?rash, hives,?itching or?sores  NEURO:? No?headaches, numbness, tingling,? weakness, or ?dizziness  PSYCH:? No anxiety, depression, ?irritability, ?suicidal ideation or hallucinations  ENDO:? No ?polyuria, ?polydipsia, ?polyphagia  HEME:? No Bruising, lymphadenopathy, bleeding disorders ?or?signs of anemia  Physical Exam  Vitals & Measurements  HR:?76(Peripheral)? BP:?120/76?  HT:?178?cm? WT:?101.5?kg? BMI:?32.04?  GENERAL: NAD, alert and oriented x 3  SKIN:? no rash or abnormal appearing skin  lesions  HEENT:? PERRLA, EOMI, mouth wnl, throat wnl, EAC and TM wnl bilaterally  NECK:? FROM, no lymphadenopathy, no thyroid abnormalities palpable  CHEST:? CTA bilaterally no wheezes, crackles or rubs  CARDIAC:? RRR, no murmurs audible  ABDOMEN:? Soft, nontender, nondistended, NBSx4,?no rebound or guarding, no HSM  EXTREMITIES:? no clubbing, cyanosis, or edema.? joints wnl. +2 DP/PT pulse bilaterally  NEURO:? no sensory or motor deficits noted. CN II-XII intact. Gait wnl.?  GENITAL: normal testes, no hernia  RECTAL: no hemorrhoids or fissures, prostate WNL, Abnormal circular membrane felt just distal to prostate, ?scar tissue, pt has noticed decrease caliber of stools.  Assessment/Plan  1.?Wellness examination?Z00.00  CBC, CMP, FLP, U/A, PSA, A1C??colonoscopy 2011 due 2021, Encourage pt to increase cardiovascular exercise and attempt to obtain at least 150 minutes of moderate aerobic exercise per week or 75 minutes of vigorous aerobic exercise weekly.  ?   Refer to Dr Watt to evaluate abnormal digital exam  Ordered:  Clinic Follow up, *Est. 06/14/20 3:00:00 CDT, Order for future visit, Wellness examination  Hypercholesterolemia  GERD - Gastro-esophageal reflux disease  Hypertriglyceridemia, HLink AFP  Comprehensive Metabolic Panel, Routine collect, 06/14/19 13:46:00 CDT, Blood, Stop date 06/14/19 13:46:00 CDT, Lab Collect, Wellness examination  Hypercholesterolemia, 06/14/19 13:46:00 CDT  Lipid Panel, Routine collect, 06/14/19 13:46:00 CDT, Blood, Stop date 06/14/19 13:46:00 CDT, Lab Collect, Wellness examination  Hypercholesterolemia  Hypertriglyceridemia, 06/14/19 13:46:00 CDT  Preventative Health Care Est 40-64 years [id number] PC, Wellness examination  Hypercholesterolemia  GERD - Gastro-esophageal reflux disease  Hypertriglyceridemia  Male erectile disorder  H/O hyperglycemia, HLINK AMB - AFP, 06/14/19 13:26:00 CDT  Urinalysis Complete no reflex, Routine collect, Urine, 06/14/19 13:46:00 CDT, Stop  date 06/14/19 13:46:00 CDT, Nurse collect, Wellness examination  Male erectile disorder  ?  2.?Prostate cancer screening?Z12.5  ?PSA  Ordered:  Prostate Specific Antigen, Routine collect, 06/14/19 13:46:00 CDT, Blood, Stop date 06/14/19 13:46:00 CDT, Lab Collect, Prostate cancer screening, 06/14/19 13:46:00 CDT  ?  3.?Immunization due?Z23  ?shingrix when available  ?  4.?Hypercholesterolemia?E78.00  ?continue atorvastatin 20 mg  Ordered:  Clinic Follow up, *Est. 06/14/20 3:00:00 CDT, Order for future visit, Wellness examination  Hypercholesterolemia  GERD - Gastro-esophageal reflux disease  Hypertriglyceridemia, HLink AFP  Comprehensive Metabolic Panel, Routine collect, 06/14/19 13:46:00 CDT, Blood, Stop date 06/14/19 13:46:00 CDT, Lab Collect, Wellness examination  Hypercholesterolemia, 06/14/19 13:46:00 CDT  Lipid Panel, Routine collect, 06/14/19 13:46:00 CDT, Blood, Stop date 06/14/19 13:46:00 CDT, Lab Collect, Wellness examination  Hypercholesterolemia  Hypertriglyceridemia, 06/14/19 13:46:00 CDT  Preventative Health Care Est 40-64 years [id number] PC, Wellness examination  Hypercholesterolemia  GERD - Gastro-esophageal reflux disease  Hypertriglyceridemia  Male erectile disorder  H/O hyperglycemia, HLINK AMB - AFP, 06/14/19 13:26:00 CDT  ?  5.?GERD - Gastro-esophageal reflux disease?K21.9  ?continue prevacid 30 mg  Ordered:  Clinic Follow up, *Est. 06/14/20 3:00:00 CDT, Order for future visit, Wellness examination  Hypercholesterolemia  GERD - Gastro-esophageal reflux disease  Hypertriglyceridemia, HLink AFP  Preventative Health Care Est 40-64 years [id number] PC, Wellness examination  Hypercholesterolemia  GERD - Gastro-esophageal reflux disease  Hypertriglyceridemia  Male erectile disorder  H/O hyperglycemia, HLINK AMB - AFP, 06/14/19 13:26:00 CDT  ?  6.?Hypertriglyceridemia?E78.1  ?d/c fenofibrate, low fat diet and exercise  Ordered:  Clinic Follow up, *Est. 06/14/20 3:00:00 CDT, Order for future  visit, Wellness examination  Hypercholesterolemia  GERD - Gastro-esophageal reflux disease  Hypertriglyceridemia, HLink AFP  Lipid Panel, Routine collect, 06/14/19 13:46:00 CDT, Blood, Stop date 06/14/19 13:46:00 CDT, Lab Collect, Wellness examination  Hypercholesterolemia  Hypertriglyceridemia, 06/14/19 13:46:00 CDT  Preventative Health Care Est 40-64 years [id number] PC, Wellness examination  Hypercholesterolemia  GERD - Gastro-esophageal reflux disease  Hypertriglyceridemia  Male erectile disorder  H/O hyperglycemia, HLINK AMB - AFP, 06/14/19 13:26:00 CDT  ?  7.?Male erectile disorder?N52.9  ?continue sildenafil 20 mg prn  Ordered:  Preventative Health Care Est 40-64 years [id number] PC, Wellness examination  Hypercholesterolemia  GERD - Gastro-esophageal reflux disease  Hypertriglyceridemia  Male erectile disorder  H/O hyperglycemia, HLINK AMB - AFP, 06/14/19 13:26:00 CDT  Urinalysis Complete no reflex, Routine collect, Urine, 06/14/19 13:46:00 CDT, Stop date 06/14/19 13:46:00 CDT, Nurse collect, Wellness examination  Male erectile disorder  ?  8.?H/O hyperglycemia?Z86.39  ?check a1C  Ordered:  Hemoglobin A1c, Routine collect, 06/14/19 13:46:00 CDT, Blood, Stop date 06/14/19 13:46:00 CDT, Lab Collect, H/O hyperglycemia, 06/14/19 13:46:00 CDT  Preventative Health Care Est 40-64 years [id number] PC, Wellness examination  Hypercholesterolemia  GERD - Gastro-esophageal reflux disease  Hypertriglyceridemia  Male erectile disorder  H/O hyperglycemia, HLINK AMB - AFP, 06/14/19 13:26:00 CDT  ?  9.?Abnormal digital rectal exam?R68.89  ?stenotic feeling membrane just inside anal verge-- refer to Dr Bernard  Ordered:  External Referral, ABN digital exam, stenotic membrane??, Dr Watt(seen in past, champagne, 06/14/19 13:37:00 CDT, Abnormal digital rectal exam  ?  Orders:  atorvastatin, See Instructions, TAKE 1 TABLET BY MOUTH DAILY, # 90 tab(s), 3 Refill(s), Pharmacy: [facility]/pharmacy #[id number]  lansoprazole, See  Instructions, TAKE ONE CAPSULE BY MOUTH DAILY, # 90 cap(s), 3 Refill(s), Pharmacy: [facility]/pharmacy #[id number]  Referrals  External Referral, ABN digital exam, stenotic membrane??, Dr Watt(seen in past, champagne, 06/14/19 13:37:00 CDT, Abnormal digital rectal exam  Clinic Follow up, *Est. 06/14/20 3:00:00 CDT, Order for future visit, Wellness examination  Hypercholesterolemia  GERD - Gastro-esophageal reflux disease  Hypertriglyceridemia, HLink AFP   Problem List/Past Medical History  Ongoing  Abnormal digital rectal exam  Degenerative disc disease  Diplopia  Diverticulitis  Duodenitis  Esophageal stricture  Fatty liver  GERD - Gastro-esophageal reflux disease  H/O hyperglycemia  Hypercholesterolemia  Hypertriglyceridemia  Immunization due  Lumbar spondylolisthesis  Lumbar spondylosis  Male erectile disorder  Mitral regurgitation  Nocturia  Obesity  Rectal stricture  Superior oblique palsy  Wellness examination  Historical  Allergic rhinitis  Dislocation of interphalangeal joint of right thumb  Erectile dysfunction  Extensor tendon laceration of right hand with open wound  GERD - Gastro-esophageal reflux disease  Hypercholesterolemia  Hypertriglyceridemia  Open displaced fracture of first metacarpal bone of right hand  Open displaced fracture of proximal phalanx of right thumb  Open displaced fracture of shaft of second metacarpal bone of right hand  Procedure/Surgical History  Incision & Drainage Upper Extremity (Right, Hand) (11/11/2016)  Insertion of Internal Fixation Device into Right Thumb Phalanx, Percutaneous Approach (11/11/2016)  ORIF Hand (Right) (11/11/2016)  Repair Right Hand Tendon, Open Approach (11/11/2016)  Reposition Right Metacarpal with Internal Fixation Device, Open Approach (11/11/2016)  Colonoscopy (2011)  Ganglion cyst (2005)  sinus sx (2001)  Tonsillectomy and adenoidectomy (1968)  carpel tunnel release  hemmrrrhoid removal   Medications  atorvastatin 20 mg oral tablet, See Instructions, 3  refills  Claritin 24 Hour Allergy, Daily  lansoprazole 30 mg oral DR capsule, See Instructions, 3 refills  sildenafil 20 mg oral tablet, 20 mg, Oral, Daily  Allergies  NexIUM?(Muscle spasm - tone)  Social History  Abuse/Neglect  No, 06/14/2019  Alcohol  Current, 1-2 times per month, 05/05/2018  Employment/School  Employed, Work/School description: ., 05/05/2018  Exercise  Home/Environment  Lives with Spouse., 05/05/2018  Substance Use  Never, 05/16/2018  Tobacco  Never (less than 100 in lifetime), N/A, 06/14/2019  Never (less than 100 in lifetime), N/A, 05/07/2019  Never (less than 100 in lifetime), N/A, 03/09/2019  Family History  Alzheimers disease: Father.  Asthma.: Mother.  Dementia: Mother.  Hypercholesterolaemia: Brother.  Osteoporosis: Sister.  Stroke: Mother and Father.  Immunizations  Vaccine Date Status   influenza virus vaccine, inactivated 11/28/2016 Recorded   tetanus/diphtheria/pertussis, acel(Tdap) 11/10/2016 Given   Health Maintenance  Health Maintenance  ???Pending?(in the next year)  ??? ??OverDue  ??? ? ? ?Diabetes Screening due??and every?  ??? ? ? ?Depression Screening due??11/21/17??and every 1??year(s)  ??? ? ? ?Alcohol Misuse Screening due??01/01/19??and every 1??year(s)  ??? ??Due?  ??? ? ? ?ADL Screening due??06/14/19??and every 1??year(s)  ??? ? ? ?Aspirin Therapy for CVD Prevention due??06/14/19??and every 1??year(s)  ??? ??Due In Future?  ??? ? ? ?Obesity Screening not due until??01/01/20??and every 1??year(s)  ??? ? ? ?Blood Pressure Screening not due until??06/13/20??and every 1??year(s)  ??? ? ? ?Body Mass Index Check not due until??06/13/20??and every 1??year(s)  ???Satisfied?(in the past 1 year)  ??? ??Satisfied?  ??? ? ? ?Blood Pressure Screening on??06/14/19.??Satisfied by Ute Benson CMA  ??? ? ? ?Body Mass Index Check on??06/14/19.??Satisfied by Ute Benson CMA  ??? ? ? ?Influenza Vaccine on??02/15/19.??Satisfied by Juliette Ovalle LPN  ??? ? ? ?Obesity  Screening on??06/14/19.??Satisfied by Ute Benson CMA  ?  ?

## 2022-05-02 NOTE — HISTORICAL [facility] CERNER
This is a historical note converted from Rupa. Formatting and pictures may have been removed.  Please reference Rupa for original formatting and attached multimedia. Infectious Diseases Infusion Visit Note  ?  HPI  ?  A 60 year old male seen today in the [facility] infusion clinic for infusion of Casirivimab / Imdevimab. ?Pt met criteria based on BMI > 25.  Tested positive for COVID19 on 8/3/2021.  ?  Have discussed the Emergency Use Authorization of Casirivimab / Imdevimab at length with the patient today to include the limitations of authorized use in patients with COVID-19 as well as the potential benefits and the limitations of said benefit. ?Have also discussed the [facility] for the use of the unapproved products is only intended for the treatment of mild to moderate COVID19 in adults with COVID19 who weigh at least 40 kg who are at high risk for progressing to severe COVID19 and/or hospitalization. ?This high risk is defined as:  ?  * Older age (>/= 65)  * BMI > 25  * Pregnancy  * CKD  * DM  * Immunosuppressive Disease or immunosuppressant medications  * Cardiovascular disease (including congenital heart disease) or HTN  * Chronic lung diseases (ex: COPD, Asthma { moderate / severe, interstitial lung disease, cystic fibrosis, and pulmonary HTN)  * Sickle cell disease  * Neurodevelopmental disorders (ex: cerebral palsy) or other complex medical conditions (ex: genetic or metabolic syndromes and severe congenital anomalies)  * Having a medical-related technological dependence (ex: tracheostomy, gastrostomy, or positive pressure ventilation (not related to COVID)  * Other medical conditions or factors that could place an individual for being at high risk leading to progression of disease (additional risk-benefit situations to include, but not limited to, race or ethnicity)?  ?  Have discussed that these drugs must be administered via IV infusion in a supervised setting. ?  ?  Objective:  PE: ?VS reviewed and stable, as  documented in infusion chart  ?? ? ? ?Gen: ?AAOx3 in NAD  ?? ? ? ?HEENT: Atraumatic, normocephalic  ????????CVS: ?RRR no m/r/g noted  ?? ? ? ?C/L: ?Breathing unlabored, Lungs CTA bilateral, equal expansion of the chest wall  ?  Labs: ?Positive COVID 19 test 8/3/2021.  ?  Impression/Recommendations:  ?  COVID19  ?  - Have discussed at length with the patient regarding potential risks vs benefit of Casirivimab / Imdevimab infusion. ?[facility] fact sheet for patients, parents, and caregivers has been provided to pt. ?Alternative therapy reviewed. ?Patient is in agreement to receive infusion, plan for same today. ?Patient given aftercare instructions and number to call should issues or adverse effects arise. ??  ?

## 2022-05-02 NOTE — HISTORICAL [facility]ERNER
This is a historical note converted from R[facility]a. Formatting and pictures may have been removed.  Please reference Rupa for original formatting and attached multimedia. Chief Complaint  C/O INTERMITTENT LLQ ABDOMINAL PAIN X 2WKS. DENIES DYSURIA, FREQUENCY. AFEBRILE, DENIES N/V  History of Present Illness  2 weeks has had?pain left lower quadrant of the abdomen.? Says the pain is been intermittent.? Some pain also in the left flank?area.? No fever or chills.? No nausea vomiting.? No diarrhea or constipation.? No melena?or hematochezia.? No dysuria.??No pain?in his scrotal area. ?He has had a past history of diverticulitis in?2015 and 2016.  Review of Systems  HEENT -?? eye exam-needs exam and will do this year or early next year.  ??? Diplopia - right superior oblique palsy -resloved  CHEST? - no SOB  C/V - Dr Stephens - yrly  GI? - Dr. Watt colonoscope 2011 - repeat 10 yrs.????? rectal?stricture  ?? Dr. Nicole - chronic GERD/stricture  ???Diverticulitis - 2015 & 2016 & 2019?- Dr. Dexter CORNEJO - nocturia x 2?????? ED -RX - Sildenafil 20 mg  M/S -? Right hand laceration/fracture - Dr. Sykes/Solomon Lainez  ??? Dr Lopez - L5 spondylolisthesis?-doing well.  ??? MRI - 10/6/16 - Foraminal stenosis, Left sciatica  ??? Dr Solomon Lainez - CTS, s/p ganglion  ??? Dr. Nelson - plantar fasciiitis?  LAST CPX 5/16/18  Physical Exam  Vitals & Measurements  T:?37.3? ?C (Oral)? HR:?72(Peripheral)? BP:?130/90?  HT:?177.5?cm? WT:?104.2?kg? BMI:?33.07?  58-year-old white male.? No acute distress.  Flank-nontender no masses.  Abdomen is flat,?bowel sounds positive,?soft,?no guarding.? Tender?left lower quadrant abdomen?to deep palpation.  Urinalysis-normal.  Assessment/Plan  1.?Diverticulitis?K57.92  ?Flagyl and Bactrim DS times 10 days.? Advised if no better to call and will consider a CT scan.  Ordered:  Office/Outpatient Visit Level 3 Established [id number] PC, Diverticulitis, [facility], 02/15/19 11:11:00 CST  ?    Problem List/Past Medical History  Ongoing  Degenerative disc disease  Diplopia  Diverticulitis  Duodenitis  Esophageal stricture  Fatty liver  GERD - Gastro-esophageal reflux disease  Hypercholesterolemia  Hypertriglyceridemia  Lumbar spondylolisthesis  Lumbar spondylosis  Male erectile disorder  Mitral regurgitation  Nocturia  Obesity  Rectal stricture  Superior oblique palsy  Historical  Dislocation of interphalangeal joint of right thumb  Extensor tendon laceration of right hand with open wound  Open displaced fracture of first metacarpal bone of right hand  Open displaced fracture of proximal phalanx of right thumb  Open displaced fracture of shaft of second metacarpal bone of right hand  Procedure/Surgical History  Incision & Drainage Upper Extremity (Right, Hand) (11/11/2016)  ORIF Hand (Right) (11/11/2016)  Colonoscopy (2011)  Ganglion cyst (2005)  sinus sx (2001)  Tonsillectomy and adenoidectomy (1968)  carpel tunnel release  hemmrrrhoid removal   Medications  atorvastatin 20 mg oral tablet, See Instructions, 2 refills  Bactrim  mg-160 mg oral tablet, 1 tab(s), Oral, BID, 1 refills  Claritin 24 Hour Allergy, Daily  fenofibrate 160 mg oral tablet, See Instructions, 2 refills  Flagyl 500 mg oral tablet, 500 mg= 1 tab(s), Oral, q6hr, 1 refills  lansoprazole 30 mg oral DR capsule, See Instructions, 2 refills  sildenafil 20 mg oral tablet, See Instructions, 5 refills  Allergies  NexIUM?(Muscle spasm - tone)  Social History  Alcohol  Current, 1-2 times per month, 05/05/2018  Employment/School  Employed, Work/School description: ., 05/05/2018  Exercise  Home/Environment  Lives with Spouse., 05/05/2018  Substance Abuse  Never, 05/16/2018  Tobacco  Never (less than 100 in lifetime), N/A, 02/15/2019  Never smoker, 06/21/2015  Family History  Alzheimers disease: Father.  Asthma.: Mother.  Dementia: Mother.  Hypercholesterolaemia: Brother.  Osteoporosis: Sister.  Stroke: Mother and  Father.  Immunizations  Vaccine Date Status   influenza virus vaccine, inactivated 11/28/2016 Recorded   tetanus/diphtheria/pertussis, acel(Tdap) 11/10/2016 Given   Health Maintenance  Health Maintenance  ???Pending?(in the next year)  ??? ??OverDue  ??? ? ? ?Diabetes Screening due??and every?  ??? ? ? ?Depression Screening due??11/21/17??and every 1??year(s)  ??? ??Due?  ??? ? ? ?ADL Screening due??02/15/19??and every 1??year(s)  ??? ? ? ?Alcohol Misuse Screening due??02/15/19??and every 1??year(s)  ??? ? ? ?Aspirin Therapy for CVD Prevention due??02/15/19??and every 1??year(s)  ???Satisfied?(in the past 1 year)  ??? ??Satisfied?  ??? ? ? ?Blood Pressure Screening on??02/15/19.??Satisfied by Juliette Ovalle LPN  ??? ? ? ?Body Mass Index Check on??02/15/19.??Satisfied by Juliette Ovalle LPN  ??? ? ? ?Diabetes Screening on??05/16/18.??Satisfied by Britta Noyola  ??? ? ? ?Influenza Vaccine on??02/15/19.??Satisfied by Juliette Ovalle LPN  ??? ? ? ?Lipid Screening on??05/16/18.??Satisfied by Britta Noyola  ??? ? ? ?Obesity Screening on??02/15/19.??Satisfied by Jluiette Ovalle LPN  ?  ?

## 2022-05-02 NOTE — HISTORICAL [facility]
This is a historical note converted from Rupa. Formatting and pictures may have been removed.  Please reference Rupa for original formatting and attached multimedia. Chief Complaint  Wellness  History of Present Illness  Here for yearly exam.? He is doing well.? Injury to his right hand?has healed-no further surgery is planned at present. ?He has some limitation?of motion?especially involving the thumb.? He complains of?severe snoring.? At times he has to sleep in a different room from his wife.? Possible apnea.? He is tired in the mornings.? On weekends he occasionally takes a nap during the day.? Hypersomnolence?during the day.? He would like to go to a sleep clinic.? Also noticed some?skin lesions on his left hand and forearm?dorsal surface?for the last few months.  Review of Systems  HEENT -?? eye exam-needs exam and will do this year or early next year.  ??? Diplopia - right superior oblique palsy -resloved  CHEST? - no SOB  C/V - Dr Stephens - yrly  GI? - Dr. Watt colonoscope 2011 - repeat 10 yrs.????? rectal?stricture  ?? Dr. Nicole - chronic GERD/stricture  ???Diverticulitis - 2015 & 2016 - Dr. Dexter CORNEJO - nocturia x 2  M/S -? Right hand laceration/fracture - Dr. Sykes/Solomon Lainez  ??? Dr Lopez - L5 spondylolisthesis?-doing well.  ??? MRI - 10/6/16 - Foraminal stenosis, Left sciatica  ??? Dr Solomon Lainez - CTS, s/p ganglion  ??? Dr. Nelson - plantar fasciiitis  ?  Physical Exam  Vitals & Measurements  T:?37.1? ?C (Oral)? HR:?68(Peripheral)? BP:?136/84?  HT:?177.5?cm? HT:?177.5?cm? WT:?102.5?kg? WT:?102.5?kg? BMI:?32.53?  GEN?57-year-old white male.? Well-developed. ?Well-nourished. ?No acute distress. ?Overweight.  SKIN?on the dorsal surface of his hand and also his forearm he has?several lesions?less than 1 cm?slightly raised?scaly.? Questionable flat warts or actinic keratosis.? Will refer to dermatologist.  PULSE?regular  HEENT?normal  NECK?no  bruits  THYROID?normal  CHEST?clear  HEART?regular rate and rhythm without murmur  ABDOMEN?soft nontender no masses  GENITALIA?normal male; no hernia  RECTAL?no masses-he does have a?anal stricture.  PROSTATE?normal  EXTREMITIES?no edema  Assessment/Plan  1.?Wellness examination  Ordered:  CBC w/ Auto Diff, Routine collect, 05/16/18 8:29:00 CDT, Blood, Order for future visit, Stop date 05/16/18 8:29:00 CDT, Lab Collect, Wellness examination  Hypercholesterolemia, 05/16/18 8:29:00 CDT  Clinic Follow up, *Est. 05/16/19 3:00:00 CDT, PLEASE MAKE THIS A 30 MINUTE PHYSICAL, Order for future visit, Wellness examination, [facility]  Comprehensive Metabolic Panel, Routine collect, 05/16/18 8:29:00 CDT, Blood, Order for future visit, Stop date 05/16/18 8:29:00 CDT, Lab Collect, Wellness examination  Hypercholesterolemia, 05/16/18 8:29:00 CDT  External Referral, skin lesions on left hand/forearm, dermatologist, no preference, 05/16/18 8:29:00 CDT, Wellness examination  Fatty liver  Hypercholesterolemia  Hypertriglyceridemia  Skin lesion  External Referral, daytime hypersomnolence, sleep clinic, [facility] sleep clinic, snores, falls asleep during day, ?apnea, tired in AM, 05/16/18 8:29:00 CDT, Wellness examination  Fatty liver  Hypercholesterolemia  Hypertriglyceridemia  Lab Collection Request, 05/16/18 8:29:00 CDT, [facility], 05/16/18 8:29:00 CDT  Lipid Panel, Routine collect, 05/16/18 8:29:00 CDT, Blood, Order for future visit, Stop date 05/16/18 8:29:00 CDT, Lab Collect, Wellness examination  Hypercholesterolemia  Hypertriglyceridemia, 05/16/18 8:29:00 CDT  Preventative Health Care Est 40-64 years [id number] PC, Wellness examination  Fatty liver  Hypercholesterolemia  Hypertriglyceridemia, [facility], 05/16/18 8:29:00 CDT  Prostate Specific Antigen, Routine collect, 05/16/18 8:29:00 CDT, Blood, Order for future visit, Stop date 05/16/18 8:29:00 CDT, Lab Collect, Wellness examination  Nocturia, 05/16/18 8:29:00  CDT  Urinalysis Complete no reflex, Routine collect, Urine, Order for future visit, 05/16/18 8:29:00 CDT, Stop date 05/16/18 8:29:00 CDT, Nurse collect, Wellness examination  Nocturia  ?  2.?Fatty liver  Ordered:  External Referral, skin lesions on left hand/forearm, dermatologist, no preference, 05/16/18 8:29:00 CDT, Wellness examination  Fatty liver  Hypercholesterolemia  Hypertriglyceridemia  Skin lesion  External Referral, daytime hypersomnolence, sleep clinic, [facility] sleep clinic, snores, falls asleep during day, ?apnea, tired in AM, 05/16/18 8:29:00 CDT, Wellness examination  Fatty liver  Hypercholesterolemia  Hypertriglyceridemia  Lab Collection Request, 05/16/18 8:29:00 CDT, [facility], 05/16/18 8:29:00 CDT  Preventative Health Care Est 40-64 years [id number] PC, Wellness examination  Fatty liver  Hypercholesterolemia  Hypertriglyceridemia, [facility], 05/16/18 8:29:00 CDT  ?  3.?Hypercholesterolemia  ?Yearly appointment with Dr. Stephens  Ordered:  CBC w/ Auto Diff, Routine collect, 05/16/18 8:29:00 CDT, Blood, Order for future visit, Stop date 05/16/18 8:29:00 CDT, Lab Collect, Wellness examination  Hypercholesterolemia, 05/16/18 8:29:00 CDT  Comprehensive Metabolic Panel, Routine collect, 05/16/18 8:29:00 CDT, Blood, Order for future visit, Stop date 05/16/18 8:29:00 CDT, Lab Collect, Wellness examination  Hypercholesterolemia, 05/16/18 8:29:00 CDT  External Referral, yearly exam/hyperchol/hypertrig, cardiololgist, Dr. Stephens, sees yearly - Dr. Stephens now with [facility]- ? new address, 05/16/18 8:29:00 CDT, Hypercholesterolemia  Hypertriglyceridemia  External Referral, skin lesions on left hand/forearm, dermatologist, no preference, 05/16/18 8:29:00 CDT, Wellness examination  Fatty liver  Hypercholesterolemia  Hypertriglyceridemia  Skin lesion  External Referral, daytime hypersomnolence, sleep clinic, [facility] sleep clinic, snores, falls asleep during day, ?apnea, tired in AM, 05/16/18  8:29:00 CDT, Wellness examination  Fatty liver  Hypercholesterolemia  Hypertriglyceridemia  Lab Collection Request, 05/16/18 8:29:00 CDT, [facility], 05/16/18 8:29:00 CDT  Lipid Panel, Routine collect, 05/16/18 8:29:00 CDT, Blood, Order for future visit, Stop date 05/16/18 8:29:00 CDT, Lab Collect, Wellness examination  Hypercholesterolemia  Hypertriglyceridemia, 05/16/18 8:29:00 CDT  Preventative Health Care Est 40-64 years [id number] PC, Wellness examination  Fatty liver  Hypercholesterolemia  Hypertriglyceridemia, [facility], 05/16/18 8:29:00 CDT  ?  4.?Hypertriglyceridemia  Ordered:  External Referral, yearly exam/hyperchol/hypertrig, cardiololgist, Dr. Stephens, sees yearly - Dr. Stephens now with [facility]- ? new address, 05/16/18 8:29:00 CDT, Hypercholesterolemia  Hypertriglyceridemia  External Referral, skin lesions on left hand/forearm, dermatologist, no preference, 05/16/18 8:29:00 CDT, Wellness examination  Fatty liver  Hypercholesterolemia  Hypertriglyceridemia  Skin lesion  External Referral, daytime hypersomnolence, sleep clinic, [facility] sleep clinic, snores, falls asleep during day, ?apnea, tired in AM, 05/16/18 8:29:00 CDT, Wellness examination  Fatty liver  Hypercholesterolemia  Hypertriglyceridemia  Lab Collection Request, 05/16/18 8:29:00 CDT, [facility], 05/16/18 8:29:00 CDT  Lipid Panel, Routine collect, 05/16/18 8:29:00 CDT, Blood, Order for future visit, Stop date 05/16/18 8:29:00 CDT, Lab Collect, Wellness examination  Hypercholesterolemia  Hypertriglyceridemia, 05/16/18 8:29:00 CDT  Preventative Health Care Est 40-64 years [id number] PC, Wellness examination  Fatty liver  Hypercholesterolemia  Hypertriglyceridemia, [facility], 05/16/18 8:29:00 CDT  ?  5.?Nocturia  Ordered:  Prostate Specific Antigen, Routine collect, 05/16/18 8:29:00 CDT, Blood, Order for future visit, Stop date 05/16/18 8:29:00 CDT, Lab Collect, Wellness examination  Nocturia, 05/16/18 8:29:00  CDT  Urinalysis Complete no reflex, Routine collect, Urine, Order for future visit, 05/16/18 8:29:00 CDT, Stop date 05/16/18 8:29:00 CDT, Nurse collect, Wellness examination  Nocturia  ?  Skin lesion  ?Refer to dermatologist  Ordered:  External Referral, skin lesions on left hand/forearm, dermatologist, no preference, 05/16/18 8:29:00 CDT, Wellness examination  Fatty liver  Hypercholesterolemia  Hypertriglyceridemia  Skin lesion  ?  Copy of lab to Dr. Stephens.? Refer to sleep clinic.   Problem List/Past Medical History  Ongoing  Degenerative disc disease  Diplopia  Diverticulitis  Duodenitis  Esophageal stricture  Fatty liver  GERD - Gastro-esophageal reflux disease  Hypercholesterolemia  Hypertriglyceridemia  Lumbar spondylolisthesis  Lumbar spondylosis  Mitral regurgitation  Obesity  Rectal stricture  Superior oblique palsy  Historical  Dislocation of interphalangeal joint of right thumb  Extensor tendon laceration of right hand with open wound  Open displaced fracture of first metacarpal bone of right hand  Open displaced fracture of proximal phalanx of right thumb  Open displaced fracture of shaft of second metacarpal bone of right hand  Procedure/Surgical History  Incision & Drainage Upper Extremity (Right, Hand) (11/11/2016), Insertion of Internal Fixation Device into Right Thumb Phalanx, Percutaneous Approach (11/11/2016), ORIF Hand (Right) (11/11/2016), Repair Right Hand Tendon, Open Approach (11/11/2016), Reposition Right Metacarpal with Internal Fixation Device, Open Approach (11/11/2016), Colonoscopy (2011), Ganglion cyst (2005), sinus sx (2001), Tonsillectomy and adenoidectomy (1968), carpel tunnel release, hemmrrrhoid removal.  Medications  atorvastatin 20 mg oral tablet, 20 mg= 1 tab(s), Oral, Daily, 3 refills  Claritin 24 Hour Allergy, Daily  fenofibrate 160 mg oral tablet, 160 mg= 1 tab(s), Oral, Daily, 3 refills  lansoprazole 30 mg oral DR capsule, 30 mg= 1 cap(s), Oral, Daily, 3  refills  Allergies  NexIUM?(Muscle spasm - tone)  Social History  Alcohol  Current, 1-2 times per month, 05/05/2018  Employment/School  Employed, Work/School description: ., 05/05/2018  Exercise  Home/Environment  Lives with Spouse., 05/05/2018  Substance Abuse  Never, 05/16/2018  Tobacco  Never smoker, 06/21/2015  Family History  Alzheimers disease: Father.  Asthma.: Mother.  Dementia: Mother.  Hypercholesterolaemia: Brother.  Osteoporosis: Sister.  Stroke: Mother and Father.  Immunizations  Vaccine Date Status   influenza virus vaccine, inactivated 11/28/2016 Recorded   tetanus/diphtheria/pertussis, acel(Tdap) 11/10/2016 Given       May 16, 2018 lab:?CBC, UA,?PSA-within normal limits.? CMP-glucose-111.? Total cholesterol 177, .? Recommend low-carb diet weight loss.

## 2022-05-02 NOTE — HISTORICAL [facility] CERNER
This is a historical note converted from [facility]. Formatting and pictures may have been removed.  Please reference Rupa for original formatting and attached multimedia. Chief Complaint  WELLNESS VISIT  History of Present Illness  59 y/o male here for wellness  has questions about COVID vaccines  planning to receive with his wife  fasting for labwork  trying to lose weight- cut out soft drinks  ?  ?  ?  9/20  male here for follow up of left elbow and forearm pain.  has been using counterforce band and taking anti-inflammatory, its not helping.  he has not been able to slow down on work .  says counterforce band helping some.  works as . does a lot of work with both hands. right hand dominant but with previous right hand injury, he uses his left arm more  ?   has seen Dr. Solomon Lainez in the past for his hand  ?  ?  ?   8/20  male with 2 weeks of left elbow pain.  having some trouble with gripping and strength.  taking advil which helps some  does not recall onset of pain  denies injury  works as  so some repetitive use  has had previous carpal tunnel release on bilateral wrists  ?   also requesting refill of sildenafil, previously prescribed by Dr. blandon, takes 1-2 tablets maybe once weekly prn ED  ?   6/20  male here for wellness.  complains of back pain today.  right sided spasms x 2 weeks. mid to low back, right side  taking advil.  reports he has had issues with sciatica on the left side.  on chart review he has had back problems in the past- MRI with Dr. Lopez  pt says he had epidural injections in the past with Dr. lopez  ?   colon screening - Dr. caraballo  cardiology- [facility], says he had recetn visit there. no longer sees Dr. short  ?  nocturia x 1  BMs reported as normal  denies urine complaints, no blood no dysuria  ?  ?  ?  3/20  here for left flank pain for 24 ?hours.  says he googled and thought it could be a kidney stone although he says he has never had a kidney stone.  he  does report h/o diverticular disease.  says?pain started yesterday morning. took advil and started drinking water.  denies urine symptoms.  no dysuria, no blood  pain is?left sided, flank and left abdomen.  scale is 4/10 after?taking advil  pain is constant left sided.  last BM last night.  no blood, normal.  last meal was this morning, denies nausea or vomiting.  denies fever  ?  Dr. Carl hx;  HEENT -?? eye exam-needs exam and will do this year or early next year.  ??? Diplopia - right superior oblique palsy -resloved  CHEST? - no SOB  C/V - Dr Stephens - yrly  GI? - Dr. Watt colonoscope 2011 - repeat 10 yrs.????? rectal?stricture  ?? Dr. Nicole - chronic GERD/stricture  ???Diverticulitis - 2015 & 2016 & 2019?- Dr. Dexter CORNEJO - nocturia x 2?????? ED -RX - Sildenafil 20 mg  M/S -? Right hand laceration/fracture - Dr. Sykes/Solomon Lainez  ??? Dr Lopez - L5 spondylolisthesis?-doing well.  ??? MRI - 10/6/16 - Foraminal stenosis, Left sciatica  ??? Dr Solomon Lainez - CTS, s/p ganglion  ??? Dr. Nelson - plantar fasciiitis?  Review of Systems  Constitutional: No fever, No chills, No sweats, No weakness, No fatigue, No decreased activity, no weight changes  ?????Eye: No blurring, No double vision.  ?????Ear/Nose/Mouth/Throat: No nasal congestion, No sore throat.  ?????Respiratory: No shortness of breath, No cough, No wheezing, No cyanosis.  ?????Cardiovascular: No chest pain, No palpitations, No bradycardia, No tachycardia, No peripheral edema.  ?????Gastrointestinal: No nausea, No vomiting, No diarrhea, No constipation, No abdominal pain.  ?????Genitourinary: No dysuria, No hematuria.  ?????Musculoskeletal: No back pain, No neck pain, No joint pain, No muscle pain, No claudication.  ?????Integumentary: No rash.  ?????Neurologic:? No abnormal balance, No confusion, No numbness, No tingling, No headache.  ?????Psychiatric: No anxiety, No depression.  Physical Exam  Vitals &  Measurements  HR:?70(Peripheral)? BP:?120/84?  HT:?177.00?cm? WT:?102.400?kg? BMI:?32.69?  ????General: Alert and oriented, No acute distress.  ?????Eye: Extraocular movements are intact, Normal conjunctiva.  ?????HENT: Normocephalic, Normal hearing, Oral mucosa is moist.  ?????Neck: Supple, Non-tender, No jugular venous distention, No lymphadenopathy, No thyromegaly.  ?????Respiratory: Lungs are clear to auscultation, Respirations are non-labored, Breath sounds are equal, Symmetrical chest wall expansion, No chest wall tenderness.  ?????Cardiovascular: Normal rate, Regular rhythm, No gallop, Good pulses equal in all extremities, Normal peripheral perfusion, No edema.  ?????Gastrointestinal: Soft, Non-tender, Non-distended,  ?????Musculoskeletal: Normal range of motion, Normal gait.  ?????Integumentary: Warm, Dry, Intact.  ?????Neurologic: Alert, Oriented, No focal deficits.  ?????Psychiatric: Cooperative, Appropriate mood & affect  Assessment/Plan  1.?Wellness examination?Z00.00  Ordered:  Automated Diff, Routine collect, 07/01/21 9:38:00 CDT, Blood, Collected, Stop date 07/01/21 9:38:00 CDT, Lab Collect, Wellness examination, 07/01/21 9:38:00 CDT  CBC w/ Auto Diff, Routine collect, 07/01/21 9:38:00 CDT, Blood, Stop date 07/01/21 9:38:00 CDT, Lab Collect, Wellness examination, 07/01/21 9:38:00 CDT  Clinic Follow-Up Wellness, *Est. 07/01/22 3:00:00 CDT, Order for future visit, Wellness examination, [facility] AFP  Comprehensive Metabolic Panel, Routine collect, 07/01/21 9:38:00 CDT, Blood, Stop date 07/01/21 9:38:00 CDT, Lab Collect, Wellness examination, 07/01/21 9:38:00 CDT  Lipid Panel, Routine collect, 07/01/21 9:38:00 CDT, Blood, Stop date 07/01/21 9:38:00 CDT, Lab Collect, Wellness examination, 07/01/21 9:38:00 CDT  Preventative Health Care Est 40-64 years [id number] PC, Wellness examination  Hypercholesterolemia  GERD - Gastro-esophageal reflux disease  Nocturia  Vitamin D deficiency  Screening PSA (prostate  specific antigen)  Lipid screening  Diabetes mellitus screening, [facility] AMB - AFP, 07/01/21 9:30:00 CDT  Prostate Specific Antigen, Routine collect, 07/01/21 9:38:00 CDT, Blood, Stop date 07/01/21 9:38:00 CDT, Lab Collect, Wellness examination, 07/01/21 9:38:00 CDT  Thyroid Stimulating Hormone, Routine collect, 07/01/21 9:38:00 CDT, Blood, Stop date 07/01/21 9:38:00 CDT, Lab Collect, Wellness examination, 07/01/21 9:38:00 CDT  Urinalysis no Reflex, Routine collect, Urine, 07/01/21 9:38:00 CDT, Stop date 07/01/21 9:38:00 CDT, Nurse collect, Wellness examination  ?  2.?HTN (hypertension)?I10  ?  3.?Hypercholesterolemia?E78.00  Ordered:  Clinic Follow up, *Est. 01/01/22 3:00:00 CST, Order for future visit, Hypercholesterolemia, [facility] AFP  Preventative Health Care Est 40-64 years [id number] PC, Wellness examination  Hypercholesterolemia  GERD - Gastro-esophageal reflux disease  Nocturia  Vitamin D deficiency  Screening PSA (prostate specific antigen)  Lipid screening  Diabetes mellitus screening, [facility] AMB - AFP, 07/01/21 9:30:00 CDT  ?  4.?GERD - Gastro-esophageal reflux disease?K21.9  Ordered:  Preventative Health Care Est 40-64 years [id number] PC, Wellness examination  Hypercholesterolemia  GERD - Gastro-esophageal reflux disease  Nocturia  Vitamin D deficiency  Screening PSA (prostate specific antigen)  Lipid screening  Diabetes mellitus screening, [facility] AMB - AFP, 07/01/21 9:30:00 CDT  ?  5.?Nocturia?R35.1  Ordered:  Preventative Health Care Est 40-64 years [id number] PC, Wellness examination  Hypercholesterolemia  GERD - Gastro-esophageal reflux disease  Nocturia  Vitamin D deficiency  Screening PSA (prostate specific antigen)  Lipid screening  Diabetes mellitus screening, [facility] AMB - AFP, 07/01/21 9:30:00 CDT  ?  6.?Vitamin D deficiency?E55.9  Ordered:  Preventative Health Care Est 40-64 years [id number] PC, Wellness examination  Hypercholesterolemia  GERD - Gastro-esophageal reflux disease  Nocturia   Vitamin D deficiency  Screening PSA (prostate specific antigen)  Lipid screening  Diabetes mellitus screening, [facility] AMB - AFP, 07/01/21 9:30:00 CDT  Vitamin D, 25-Hydroxy Level, Routine collect, 07/01/21 9:38:00 CDT, Blood, Stop date 07/01/21 9:38:00 CDT, Lab Collect, Vitamin D deficiency, 07/01/21 9:38:00 CDT  ?  7.?Screening PSA (prostate specific antigen)?Z12.5  Ordered:  Preventative Health Care Est 40-64 years [address]03 PC, Wellness examination  Hypercholesterolemia  GERD - Gastro-esophageal reflux disease  Nocturia  Vitamin D deficiency  Screening PSA (prostate specific antigen)  Lipid screening  Diabetes mellitus screening, [facility] AMB - AFP, 07/01/21 9:30:00 CDT  ?  8.?Lipid screening?Z13.220  Ordered:  Preventative Health Care Est 40-64 years [id number] PC, Wellness examination  Hypercholesterolemia  GERD - Gastro-esophageal reflux disease  Nocturia  Vitamin D deficiency  Screening PSA (prostate specific antigen)  Lipid screening  Diabetes mellitus screening, [facility] AMB - AFP, 07/01/21 9:30:00 CDT  ?  9.?Diabetes mellitus screening?Z13.1  Ordered:  Preventative Health Care Est 40-64 years [id number] PC, Wellness examination  Hypercholesterolemia  GERD - Gastro-esophageal reflux disease  Nocturia  Vitamin D deficiency  Screening PSA (prostate specific antigen)  Lipid screening  Diabetes mellitus screening, [facility] AMB - AFP, 07/01/21 9:30:00 CDT  ?  fasting labs  due colon screening this year- Dr. caraballo  exercise  DASH  6 month and annual wellness scheduled  questions answered to the best of my ability regarding COVID vaccine, efficacy and avaibale vaccine- refer to FDA website for specific patient info sheets per vaccine available  Referrals  Clinic Follow up, *Est. 01/01/22 3:00:00 CST, Order for future visit, Hypercholesterolemia, [facility] AFP  Clinic Follow-Up Wellness, *Est. 07/01/22 3:00:00 CDT, Order for future visit, Wellness examination, [facility] AFP   Problem List/Past Medical  History  Ongoing  Abnormal digital rectal exam  Degenerative disc disease  Diplopia  Diverticulitis  Duodenitis  Esophageal stricture  Fatty liver  GERD - Gastro-esophageal reflux disease  H/O hyperglycemia  Hypercholesterolemia  Hypertriglyceridemia  Immunization due  Lumbar spondylolisthesis  Lumbar spondylosis  Male erectile disorder  Mitral regurgitation  Nocturia  Obesity  Rectal stricture  Superior oblique palsy  Wellness examination  Historical  Allergic rhinitis  Dislocation of interphalangeal joint of right thumb  Erectile dysfunction  Extensor tendon laceration of right hand with open wound  GERD - Gastro-esophageal reflux disease  Hypercholesterolemia  Hypertriglyceridemia  Open displaced fracture of first metacarpal bone of right hand  Open displaced fracture of proximal phalanx of right thumb  Open displaced fracture of shaft of second metacarpal bone of right hand  Procedure/Surgical History  Incision & Drainage Upper Extremity (Right, Hand) (11/11/2016)  Insertion of Internal Fixation Device into Right Thumb Phalanx, Percutaneous Approach (11/11/2016)  ORIF Hand (Right) (11/11/2016)  Repair Right Hand Tendon, Open Approach (11/11/2016)  Reposition Right Metacarpal with Internal Fixation Device, Open Approach (11/11/2016)  Colonoscopy (2011)  Ganglion cyst (2005)  sinus sx (2001)  Tonsillectomy and adenoidectomy (1968)  carpel tunnel release  hemmrrrhoid removal   Medications  atorvastatin 20 mg oral tablet, See Instructions  celecoxib 200 mg oral capsule, 200 mg= 1 cap(s), Oral, Daily,? ?Not taking  Claritin 24 Hour Allergy, Daily  D3 50,000 intl units (1250 mcg) oral capsule, See Instructions  hydrochlorothiazide 12.5 mg oral tablet, 12.5 mg= 1 tab(s), Oral, Daily  lansoprazole 30 mg oral DR capsule, See Instructions  lansoprazole 30 mg oral DR capsule, See Instructions, 3 refills  sildenafil 25 mg oral tablet, 1-2 tab(s), Oral, Daily, PRN, 2 refills  Allergies  NexIUM?(Muscle spasm - tone)  Social  History  Abuse/Neglect  No, 01/08/2021  No, 07/02/2020  No, 03/03/2020  Alcohol  Current, 1-2 times per month, 05/05/2018  Employment/School  Employed, Work/School description: [employer]., 05/05/2018  Exercise  Home/Environment  Lives with Spouse., 05/05/2018  Substance Use  Never, 05/16/2018  Tobacco  Never (less than 100 in lifetime), No, 01/08/2021  Family History  Alzheimers disease: Father.  Asthma.: Mother.  Dementia: Mother.  Hypercholesterolaemia: Brother.  Osteoporosis: Sister.  Stroke: Mother and Father.  Immunizations  Vaccine Date Status   influenza virus vaccine, inactivated 11/21/2019 Recorded   influenza virus vaccine, inactivated 11/28/2016 Recorded   tetanus/diphtheria/pertussis, acel(Tdap) 11/10/2016 Given   Health Maintenance  Health Maintenance  ???Pending?(in the next year)  ??? ??OverDue  ??? ? ? ?Influenza Vaccine due??10/01/20??and every 1??day(s)  ??? ? ? ?Hypertension Management-BMP due??06/17/21??and every 1??year(s)  ??? ??Due?  ??? ? ? ?Zoster Vaccine due??07/01/21??Unknown Frequency  ??? ??Due In Future?  ??? ? ? ?Colorectal Screening not due until??10/29/21??and every 10??year(s)  ??? ? ? ?Obesity Screening not due until??01/01/22??and every 1??year(s)  ??? ? ? ?Alcohol Misuse Screening not due until??01/02/22??and every 1??year(s)  ??? ? ? ?Depression Screening not due until??03/11/22??and every 1??year(s)  ???Satisfied?(in the past 1 year)  ??? ??Satisfied?  ??? ? ? ?ADL Screening on??07/01/21.??Satisfied by Clare Soto MD  ??? ? ? ?Alcohol Misuse Screening on??03/11/21.??Satisfied by Clare Soto MD  ??? ? ? ?Aspirin Therapy for CVD Prevention on??07/01/21.??Satisfied by Clare Soto MD??Reason: Expectation Satisfied Elsewhere  ??? ? ? ?Blood Pressure Screening on??07/01/21.??Satisfied by Juliette Ovalle LPN  ??? ? ? ?Body Mass Index Check on??07/01/21.??Satisfied by Juliette Ovalle LPN  ??? ? ? ?Depression Screening on??03/11/21.??Satisfied by Clare Soto MD  ??? ?  ? ?Hypertension Management-Blood Pressure on??07/01/21.??Satisfied by Juliette Ovalle LPN  ??? ? ? ?Influenza Vaccine on??03/11/21.??Satisfied by Clare Soto MD  ??? ? ? ?Obesity Screening on??07/01/21.??Satisfied by Juliette Ovalle LPN  ?

## 2022-05-02 NOTE — HISTORICAL [facility] CERNER
This is a historical note converted from [facility]. Formatting and pictures may have been removed.  Please reference Rupa for original formatting and attached multimedia. Chief Complaint  Wellness visit-fasting  History of Present Illness  ?  58 y/o male here for wellness.  complains of back pain today.  right sided spasms x 2 weeks. mid to low back, right side  taking advil.  reports he has had issues with sciatica on the left side.  on chart review he has had back problems in the past- MRI with Dr. Lopez  pt says he had epidural injections in the past with Dr. lopez  ?   colon screening - Dr. watt  cardiology- [facility], says he had recetn visit there. no longer sees Dr. stephens  ?   nocturia x 1  BMs reported as normal  denies urine complaints, no blood no dysuria  ?  ?  ?  ?  ?   3/20  here for left flank pain for 24 ?hours.  says he googled and thought it could be a kidney stone although he says he has never had a kidney stone.  he does report h/o diverticular disease.  says?pain started yesterday morning. took advil and started drinking water.  denies urine symptoms.  no dysuria, no blood  pain is?left sided, flank and left abdomen.  scale is 4/10 after?taking advil  pain is constant left sided.  last BM last night.  no blood, normal.  last meal was this morning, denies nausea or vomiting.  denies fever  ?  Dr. Carl hx;  HEENT -?? eye exam-needs exam and will do this year or early next year.  ??? Diplopia - right superior oblique palsy -resloved  CHEST? - no SOB  C/V - Dr Stephens - yrly  GI? - Dr. Watt colonoscope 2011 - repeat 10 yrs.????? rectal?stricture  ?? Dr. Nicole - chronic GERD/stricture  ???Diverticulitis - 2015 & 2016 & 2019?- Dr. Renteria   - nocturia x 2?????? ED -RX - Sildenafil 20 mg  M/S -? Right hand laceration/fracture - Dr. Sykes/Sol[facility] Lainez  ??? Dr Lopez - L5 spondylolisthesis?-doing well.  ??? MRI - 10/6/16 - Foraminal stenosis, Left sciatica  ??? Dr Carbajal  Lainez - CTS, s/p ganglion  ??? Dr. Nelson - plantar fasciiitis?  LAST CPX 5/16/18  Review of Systems  Constitutional: No fever, No chills, No sweats, No weakness, No fatigue, No decreased activity, no weight changes  ?????Eye: No blurring, No double vision.  ?????Ear/Nose/Mouth/Throat: No nasal congestion, No sore throat.  ?????Respiratory: No shortness of breath, No cough, No wheezing, No cyanosis.  ?????Cardiovascular: No chest pain, No palpitations, No bradycardia, No tachycardia, No peripheral edema.  ?????Gastrointestinal: No nausea, No vomiting, No diarrhea, No constipation, No abdominal pain.  ?????Genitourinary: No dysuria, No hematuria.  ?????Musculoskeletal:?right sided back pain  ?????Integumentary: No rash.  ?????Neurologic: No abnormal balance, No confusion, No numbness, No tingling, No headache.  ?????Psychiatric: No anxiety, No depression.  Physical Exam  Vitals & Measurements  T:?37.1? ?C (Oral)? HR:?66(Peripheral)? BP:?138/82?  HT:?177.5?cm? WT:?104.1?kg?  ????General: Alert and oriented, No acute distress.  ?????Eye: Extraocular movements are intact, Normal conjunctiva.  ?????HENT: Normocephalic, Normal hearing, Oral mucosa is moist.  ?????Neck: Supple, Non-tender, No carotid bruit, No jugular venous distention, No lymphadenopathy, No thyromegaly.  ?????Respiratory: Lungs are clear to auscultation, Respirations are non-labored, Breath sounds are equal, Symmetrical chest wall expansion, No chest wall tenderness.  ?????Cardiovascular: Normal rate, Regular rhythm, No murmur, No gallop, Good pulses equal in all extremities, Normal peripheral perfusion, No edema.  ?????Gastrointestinal: Soft, Non-tender, Non-distended, Normal bowel sounds.  ?????Musculoskeletal: Normal range of motion, Normal gait,?pain localized to right thoracic area and right lumbar muscle, no radiation down buttock or leg  ?????Integumentary: Warm, Dry, Intact.  ?????Neurologic: Alert, Oriented, No focal  deficits.  ?????Psychiatric: Cooperative, Appropriate mood & affect, Normal judgment, Non-suicidal.  Assessment/Plan  1.?Wellness examination?Z00.00  Ordered:  CBC w/ Auto Diff, Routine collect, 06/17/20 8:15:00 CDT, Blood, Order for future visit, Stop date 06/17/20 8:15:00 CDT, Lab Collect, Wellness examination, 06/17/20 8:15:00 CDT  Comprehensive Metabolic Panel, Routine collect, 06/17/20 8:15:00 CDT, Blood, Order for future visit, Stop date 06/17/20 8:15:00 CDT, Lab Collect, Wellness examination, 06/17/20 8:15:00 CDT  Hemoglobin A1c, Routine collect, 06/17/20 8:15:00 CDT, Blood, Order for future visit, Stop date 06/17/20 8:15:00 CDT, Lab Collect, Wellness examination, 06/17/20 8:15:00 CDT  Lab Collection Request, 06/17/20 8:15:00 CDT, [facility] - AFP, 06/17/20 8:15:00 CDT, Wellness examination  Lipid Panel, Routine collect, 06/17/20 8:15:00 CDT, Blood, Order for future visit, Stop date 06/17/20 8:15:00 CDT, Lab Collect, Wellness examination, 06/17/20 8:15:00 CDT  Prostate Specific Antigen, Routine collect, 06/17/20 8:15:00 CDT, Blood, Order for future visit, Stop date 06/17/20 8:15:00 CDT, Lab Collect, Wellness examination, 06/17/20 8:15:00 CDT  Thyroid Stimulating Hormone, Routine collect, 06/17/20 8:15:00 CDT, Blood, Order for future visit, Stop date 06/17/20 8:15:00 CDT, Lab Collect, Wellness examination, 06/17/20 8:15:00 CDT  Urinalysis no Reflex, Routine collect, Urine, Order for future visit, 06/17/20 8:15:00 CDT, Stop date 06/17/20 8:15:00 CDT, Nurse collect, Wellness examination  Vitamin D, 25-Hydroxy Level, Routine collect, 06/17/20 8:15:00 CDT, Blood, Order for future visit, Stop date 06/17/20 8:15:00 CDT, Lab Collect, Wellness examination, 06/17/20 8:15:00 CDT  ?  2.?Hypercholesterolemia?E78.00  ?  3.?GERD - Gastro-esophageal reflux disease?K21.9  ?  4.?Hypertriglyceridemia?E78.1  ?  5.?Lumbar spondylolisthesis?M43.16  Ordered:  XR Spine Lumbar 2 or 3 Views, Routine, 06/17/20 8:17:00 CDT, Other (please  specify), None, Ambulatory, Rad Type, Lumbar spondylolisthesis, [facility], 06/17/20 8:17:00 CDT  ?  6.?Nocturia?R35.1  ?  7.?BMI 33.0-33.9,adult?Z68.33  ?  8.?Esophageal stricture?K22.2  ?  9.?Fatty liver?K76.0  ?  10.?Male erectile disorder?N52.9  ?  11.?Lumbar pain?M54.5  ?  12.?Lumbar strain?S39.012A  ?  Orders:  XR Spine Thoracic 2 Views, Routine, 06/17/20 8:20:00 CDT, Other (please specify), None, Ambulatory, Rad Type, Acute mid back pain, [facility], 06/17/20 8:20:00 CDT  wellness labs  XR lumbar and thoracic  low sodium, exercise  ideal BMI less than 25.  2 weeks follow up for back pain  will notify with results   Problem List/Past Medical History  Ongoing  Abnormal digital rectal exam  Degenerative disc disease  Diplopia  Diverticulitis  Duodenitis  Esophageal stricture  Fatty liver  GERD - Gastro-esophageal reflux disease  H/O hyperglycemia  Hypercholesterolemia  Hypertriglyceridemia  Immunization due  Lumbar spondylolisthesis  Lumbar spondylosis  Male erectile disorder  Mitral regurgitation  Nocturia  Obesity  Rectal stricture  Superior oblique palsy  Wellness examination  Historical  Allergic rhinitis  Dislocation of interphalangeal joint of right thumb  Erectile dysfunction  Extensor tendon laceration of right hand with open wound  GERD - Gastro-esophageal reflux disease  Hypercholesterolemia  Hypertriglyceridemia  Open displaced fracture of first metacarpal bone of right hand  Open displaced fracture of proximal phalanx of right thumb  Open displaced fracture of shaft of second metacarpal bone of right hand  Procedure/Surgical History  Incision & Drainage Upper Extremity (Right, Hand) (11/11/2016)  Insertion of Internal Fixation Device into Right Thumb Phalanx, Percutaneous Approach (11/11/2016)  ORIF Hand (Right) (11/11/2016)  Repair Right Hand Tendon, Open Approach (11/11/2016)  Reposition Right Metacarpal with Internal Fixation Device, Open Approach  (11/11/2016)  Colonoscopy (2011)  Ganglion cyst (2005)  sinus sx (2001)  Tonsillectomy and adenoidectomy (1968)  carpel tunnel release  hemmrrrhoid removal   Medications  Advil 200 mg oral tablet, 400 mg= 2 tab(s), Oral, q4hr, PRN  atorvastatin 20 mg oral tablet, See Instructions, 3 refills  Claritin 24 Hour Allergy, Daily  fenofibrate 160 mg oral tablet, 160 mg= 1 tab(s), Oral, Daily, 2 refills,? ?Not Taking, Completed Rx  hydrochlorothiazide 12.5 mg oral tablet, 12.5 mg= 1 tab(s), Oral, Daily  lansoprazole 30 mg oral DR capsule, See Instructions, 3 refills  sildenafil 20 mg oral tablet, 20 mg, Oral, Daily  Allergies  NexIUM?(Muscle spasm - tone)  Social History  Abuse/Neglect  No, 06/17/2020  No, 03/03/2020  No, 02/05/2020  Alcohol  Current, 1-2 times per month, 05/05/2018  Employment/School  Employed, Work/School description: ., 05/05/2018  Exercise  Home/Environment  Lives with Spouse., 05/05/2018  Substance Use  Never, 05/16/2018  Tobacco  Never (less than 100 in lifetime), No, 06/17/2020  Never (less than 100 in lifetime), No, 03/03/2020  Never (less than 100 in lifetime), No, 02/05/2020  Family History  Alzheimers disease: Father.  Asthma.: Mother.  Dementia: Mother.  Hypercholesterolaemia: Brother.  Osteoporosis: Sister.  Stroke: Mother and Father.  Immunizations  Vaccine Date Status   influenza virus vaccine, inactivated 11/21/2019 Recorded   influenza virus vaccine, inactivated 11/28/2016 Recorded   tetanus/diphtheria/pertussis, acel(Tdap) 11/10/2016 Given   Health Maintenance  Health Maintenance  ???Pending?(in the next year)  ??? ??OverDue  ??? ? ? ?Diabetes Screening due??and every?  ??? ? ? ?Depression Screening due??11/21/17??and every 1??year(s)  ??? ??Due?  ??? ? ? ?Zoster Vaccine due??06/17/20??and every 100??year(s)  ??? ??Due In Future?  ??? ? ? ?Alcohol Misuse Screening not due until??01/01/21??and every 1??year(s)  ??? ? ? ?Obesity Screening not due until??01/01/21??and every  1??year(s)  ??? ? ? ?Hypertension Management-BMP not due until??03/03/21??and every 1??year(s)  ???Satisfied?(in the past 1 year)  ??? ??Satisfied?  ??? ? ? ?ADL Screening on??06/17/20.??Satisfied by lCare Soto MD  ??? ? ? ?Alcohol Misuse Screening on??06/17/20.??Satisfied by Clare Soto MD  ??? ? ? ?Aspirin Therapy for CVD Prevention on??06/17/20.??Satisfied by Clare Soto MD  ??? ? ? ?Blood Pressure Screening on??06/17/20.??Satisfied by Haydee Bangura CMA.  ??? ? ? ?Body Mass Index Check on??03/03/20.??Satisfied by Juliette Ovalle LPN  ??? ? ? ?Diabetes Screening on??03/03/20.??Satisfied by Geo Vann  ??? ? ? ?Hypertension Management-Blood Pressure on??06/17/20.??Satisfied by Haydee Bangura CMA.  ??? ? ? ?Hypertension Management-BMP on??03/03/20.??Satisfied by Geo Vann  ??? ? ? ?Influenza Vaccine on??11/21/19.??Satisfied by Juliette Ovalle LPN  ??? ? ? ?Obesity Screening on??03/03/20.??Satisfied by Juliette Ovalle LPN  ?

## 2022-09-16 ENCOUNTER — HISTORICAL (OUTPATIENT)
Dept: ADMINISTRATIVE | Facility: HOSPITAL | Age: 62
End: 2022-09-16
Payer: COMMERCIAL